# Patient Record
Sex: FEMALE | Race: BLACK OR AFRICAN AMERICAN | NOT HISPANIC OR LATINO | URBAN - METROPOLITAN AREA
[De-identification: names, ages, dates, MRNs, and addresses within clinical notes are randomized per-mention and may not be internally consistent; named-entity substitution may affect disease eponyms.]

---

## 2024-11-04 ENCOUNTER — TELEPHONE (OUTPATIENT)
Dept: HEMATOLOGY/ONCOLOGY | Facility: CLINIC | Age: 34
End: 2024-11-04
Payer: COMMERCIAL

## 2024-11-05 ENCOUNTER — TELEPHONE (OUTPATIENT)
Dept: HEMATOLOGY/ONCOLOGY | Facility: CLINIC | Age: 34
End: 2024-11-05
Payer: COMMERCIAL

## 2024-11-05 NOTE — TELEPHONE ENCOUNTER
----- Message from Joy sent at 11/5/2024  9:45 AM CST -----  Regarding: return call  Contact: patient  Type:  Patient Returning Call    Who Called:  patient  Who Left Message for Patient:    Hollie  Does the patient know what this is regarding?:  scheduling  Best Call Back Number:  [phone]  Additional Information:  Please call patient to advise.  Thanks!

## 2024-11-06 ENCOUNTER — PATIENT MESSAGE (OUTPATIENT)
Dept: HEMATOLOGY/ONCOLOGY | Facility: CLINIC | Age: 34
End: 2024-11-06

## 2024-11-06 ENCOUNTER — OFFICE VISIT (OUTPATIENT)
Dept: HEMATOLOGY/ONCOLOGY | Facility: CLINIC | Age: 34
End: 2024-11-06
Payer: COMMERCIAL

## 2024-11-06 ENCOUNTER — OFFICE VISIT (OUTPATIENT)
Dept: INTERNAL MEDICINE | Facility: CLINIC | Age: 34
End: 2024-11-06
Payer: COMMERCIAL

## 2024-11-06 VITALS
BODY MASS INDEX: 31.2 KG/M2 | OXYGEN SATURATION: 100 % | DIASTOLIC BLOOD PRESSURE: 102 MMHG | RESPIRATION RATE: 20 BRPM | WEIGHT: 222.88 LBS | HEART RATE: 94 BPM | SYSTOLIC BLOOD PRESSURE: 168 MMHG | HEIGHT: 71 IN | TEMPERATURE: 98 F

## 2024-11-06 DIAGNOSIS — D50.9 MICROCYTIC ANEMIA: Primary | ICD-10-CM

## 2024-11-06 DIAGNOSIS — I10 HTN (HYPERTENSION), BENIGN: Primary | ICD-10-CM

## 2024-11-06 PROCEDURE — 3077F SYST BP >= 140 MM HG: CPT | Mod: CPTII,S$GLB,, | Performed by: INTERNAL MEDICINE

## 2024-11-06 PROCEDURE — 3008F BODY MASS INDEX DOCD: CPT | Mod: CPTII,S$GLB,, | Performed by: INTERNAL MEDICINE

## 2024-11-06 PROCEDURE — 3080F DIAST BP >= 90 MM HG: CPT | Mod: CPTII,S$GLB,, | Performed by: INTERNAL MEDICINE

## 2024-11-06 PROCEDURE — 1160F RVW MEDS BY RX/DR IN RCRD: CPT | Mod: CPTII,95,, | Performed by: INTERNAL MEDICINE

## 2024-11-06 PROCEDURE — 1159F MED LIST DOCD IN RCRD: CPT | Mod: CPTII,95,, | Performed by: INTERNAL MEDICINE

## 2024-11-06 PROCEDURE — 99214 OFFICE O/P EST MOD 30 MIN: CPT | Mod: S$GLB,,, | Performed by: INTERNAL MEDICINE

## 2024-11-06 PROCEDURE — G2211 COMPLEX E/M VISIT ADD ON: HCPCS | Mod: 95,,, | Performed by: INTERNAL MEDICINE

## 2024-11-06 PROCEDURE — 99999 PR PBB SHADOW E&M-EST. PATIENT-LVL III: CPT | Mod: PBBFAC,,, | Performed by: INTERNAL MEDICINE

## 2024-11-06 PROCEDURE — 1159F MED LIST DOCD IN RCRD: CPT | Mod: CPTII,S$GLB,, | Performed by: INTERNAL MEDICINE

## 2024-11-06 PROCEDURE — 99204 OFFICE O/P NEW MOD 45 MIN: CPT | Mod: 95,,, | Performed by: INTERNAL MEDICINE

## 2024-11-06 RX ORDER — LOSARTAN POTASSIUM 25 MG/1
TABLET ORAL
COMMUNITY
Start: 2024-10-01

## 2024-11-06 RX ORDER — AMLODIPINE BESYLATE 10 MG/1
TABLET ORAL
COMMUNITY
Start: 2024-10-01

## 2024-11-06 RX ORDER — HYDROCHLOROTHIAZIDE 25 MG/1
TABLET ORAL
COMMUNITY
Start: 2024-10-01

## 2024-11-06 NOTE — PROGRESS NOTES
History of present illness:   [age] year lady 1st visit at our institution here to follow-up from an ED department visit on November 1, 2024.  Patient referred to ED after her ophthalmologist stated that the blood vessels in her eyes appeared swollen.  The patient was asymptomatic.  Found to have hypertensive urgency in the ED. lab data normal, chest x-ray normal, ECG unremarkable.  The patient has a known history of hypertension and has been taking her medications as directed.  She has not been having any chest pain palpitations syncope presyncope.  No severe headaches and no neurological symptoms.  She receives her medical care in [place] with PCP and also has seen a cardiologist there as well regarding her hypertension.  She also gives history of having been told she has a fluctuating anemia which was noted also on the lab data from the ED.    Current medications:   Amlodipine 10 mg daily.    Losartan 25 mg daily.    HCTZ 25 mg daily.    Past medical history:   Hypertension.    Anemia.    Social history, nonsmoker.  No alcohol excess.      Review of systems:   Constitutional: No fever no chills no generalized body aches.  HEENT: No hoarseness no dysphagia.    Respiratory: No cough shortness of breath.    Cardiovascular: No chest pain or palpitations and no syncope.  No claudication.  No edema.    GI: No nausea no vomiting abdominal pain or diarrhea.    : No dysuria frequency change in color character urine.    Neurologic:  No headaches or focal neurological symptoms.    Physical examination:   General: Pleasant alert appropriately groomed lady no acute distress.    Vital signs: Blood pressure taken manually by this examiner 148/102 similar in both arms.  HEENT: Normocephalic.  Neck supple no masses.  Lungs: Clear to auscultation.    Cardiovascular: Regular rate rhythm.  No significant murmur.  Carotids full bilaterally bruits.  No peripheral extremity edema.  Mental status: Alert oriented affect mood all  appropriate.    Data:   Reviewed the lab data from the ED department from November 1st.  Chemistry profile TSH normal CBC shows a significant microcytic anemia.      Impression:   Hypertension control not optimal.    Microcytic anemia which sounds chronic.  Suspect related to menses      Plan:   Increase losartan to 50 mg daily.    She has cardiology appointment in five days at our institution and will follow-up in that regard.      She has not a virtual visit with one of our hematology/oncology providers later this morning and I will defer the anemia evaluation in that regard.  Chest x-ray and ECG unremarkable.   Abdominal Pain, N/V/D Allergic Rx

## 2024-11-06 NOTE — PROGRESS NOTES
CONSULT TELEMEDICINE VISIT    Subjective:      Patient ID: Soraya Johnson is a 34 y.o. female.  MRN: [id number]  : 1990    An audio and visual care visit was performed with the patient because of the COVID-19 pandemic recommendations for social distancing.    TELEMEDICINE  The patient location is: home  The chief complaint leading to consultation is: anemia  Visit type: Virtual visit with synchronous audio and video      46 minutes of total time spent on the encounter, which includes face to face time and non-face to face time preparing to see the patient (eg, review of tests), obtaining and/or reviewing separately obtained history, documenting clinical information in the electronic or other health record, independently interpreting results (not separately reported) and communicating results to the patient/family/caregiver, or care coordination (not separately reported).    Each patient to whom he or she provides medical services by telemedicine is:  (1) informed of the relationship between the physician and patient and the respective role of any other health care provider with respect to management of the patient; and (2) notified that he or she may decline to receive medical services by telemedicine and may withdraw from such care at any time.    History of Present Illness:   \[facility] [place] [facility]\"" Miss. Alex Toribio is a 35 yo female presenting for evaluation of microcytic anemia.  She is reporting to this visit by herself.      In  she had her COVID vaccine and she started having anemia.  Her counts were fluctuating but she did not require taking oral iron or IV iron.  She was admitted to the hospital after developing significant clotting upon having her menstrual cycle.  At that time she received a unit of PRBC.  She has been able to establish a hematologist since she has been traveling back and forth to [place].    Blood workup done on on 2024 showed a hemoglobin of 7.4 with an MCV of 69.  CMP was  normal.    She is feeling tired.  Her menstrual cycle is regular lasting 5-7 days with a mild to moderate flow.  She is not a vegetarian.  She denies any family history of colorectal cancer.    The patient denies CP, cough, SOB, abdominal pain, nausea, vomiting, constipation.  The patient denies fever, chills, night sweats, weight loss, new lumps or bumps, easy bruising or bleeding.      Oncology History:  Oncology History    No history exists.      Past medical, surgical, family, and social history were reviewed today and there are no changes of note unless mentioned in HPI.   MEDS and ALLERGIES were reviewed with patient and meds reconciled.     History:  Past Medical History:   Diagnosis Date    Anemia, unspecified     HTN (hypertension)       History reviewed. No pertinent surgical history.  No family history on file.   Social History     Tobacco Use    Smoking status: Never    Smokeless tobacco: Never   Substance and Sexual Activity    Alcohol use: Not on file    Drug use: Not on file    Sexual activity: Not on file        ROS:  Answers submitted by the patient for this visit:  Review of Systems Questionnaire (Submitted on 11/5/2024)  appetite change : No  unexpected weight change: No  mouth sores: No  visual disturbance: No  cough: No  shortness of breath: No  chest pain: No  abdominal pain: No  diarrhea: No  frequency: No  back pain: No  rash: No  headaches: No  adenopathy: No  nervous/ anxious: No      Objective:   There were no vitals filed for this visit.  Wt Readings from Last 10 Encounters:   11/06/24 101.1 kg (222 lb 14.2 oz)   11/06/24 115.7 kg (255 lb)   11/01/24 116 kg (255 lb 11.7 oz)       Physical Examination:   Constitutional: she is alert, pleasant, and does not appear to be in any physical distress   HENT: Mouth/Throat:  Tongue is midline without evidence of glossitis  Eyes: No obvious jaundice or conjunctivitis.  EOM are normal.   Pulmonary/Chest: No stridor noted. No excess chest muscle  movement.  Neurological: she is alert and oriented to person, place, and time. No cranial nerve deficit.  Skin:  No rash noted. No erythema.   Psychiatric: she has a normal mood and affect. Speech and memory normal.     Diagnostic Tests:  Significant Imaging:  I have reviewed and interpreted all pertinent imaging results/findings.    Laboratory Data:  Lab Results   Component Value Date    WBC 6.70 11/01/2024    HGB 7.4 (L) 11/01/2024    HCT 29.5 (L) 11/01/2024     11/01/2024    ALT 15 11/01/2024    AST 13 11/01/2024     11/01/2024    K 3.3 (L) 11/01/2024     11/01/2024    CREATININE 0.8 11/01/2024    BUN 10 11/01/2024    CO2 23 11/01/2024    TSH 0.973 11/01/2024        Labs:   Lab Results   Component Value Date    WBC 6.70 11/01/2024    RBC 4.30 11/01/2024    HGB 7.4 (L) 11/01/2024    HCT 29.5 (L) 11/01/2024    MCV 69 (L) 11/01/2024     11/01/2024    GLU 97 11/01/2024     11/01/2024    K 3.3 (L) 11/01/2024    BUN 10 11/01/2024    CREATININE 0.8 11/01/2024    AST 13 11/01/2024    ALT 15 11/01/2024    BILITOT 0.3 11/01/2024         Assessment/Plan:     ECOG SCORE    0 - Fully active-able to carry on all pre-disease performance without restriction       Microcytic anemia:  I reviewed independently the patient medical record including her laboratory radiologic findings.    The differential diagnosis of a microcytic anemia was discussed at length.  Most likely the patient has iron-deficiency anemia due to her menorrhagia.  She will be started on oral iron 1 pill a day on an empty stomach.  In case she is not able to tolerated on an empty stomach she will start taking it with food and vitamin-C.  The side effects of oral iron was reviewed at length.  This include abdominal pain cramping nausea black stools and constipation.  She was advised to take stool softener on a regular basis.    She will be seen back again in 6 weeks for a follow up visit with repeat CBC and ferritin.      Med Onc  Chart Routing      Follow up with physician 6 weeks. virtual with repeat CBC and ferritin to be done in [place]   Follow up with [facility]    Infusion scheduling note    Injection scheduling note    Labs    Imaging    Pharmacy appointment    Other referrals                   Plan was discussed with the patient at length, and she verbalized understanding. Soraya was given an opportunity to ask questions that were answered to her satisfaction, and she was advised to call in the interval if any problems or questions arise.  Discussed COVID-19 and social distancing in great detail, avoid all non-essential visits out of the home if possible and avoid sick contacts.     Electronically signed by Marybeth Posey MD

## 2024-11-06 NOTE — PROGRESS NOTES
Subjective:       Name: Soraya Johnson  : 1990  MRN: [id number]    No chief complaint on file.       Patient is in clinic    HPI: Soraya Johnson is a 34 y.o. female presents for evaluation of No chief complaint on file.    Blood workup done on on 2024 showed a hemoglobin of 7.4 with an MCV of 69.  CMP was normal.    The patient denies CP, cough, SOB, abdominal pain, nausea, vomiting, constipation.  The patient denies fever, chills, night sweats, weight loss, new lumps or bumps, easy bruising or bleeding.      Oncology History    No history exists.        No past medical history on file.    No past surgical history on file.    No family history on file.    Social History     Socioeconomic History    Marital status: Single   Tobacco Use    Smoking status: Never    Smokeless tobacco: Never     Social Drivers of Health     Financial Resource Strain: Low Risk  (11/3/2024)    Overall Financial Resource Strain (CARDIA)     Difficulty of Paying Living Expenses: Not very hard   Food Insecurity: No Food Insecurity (11/3/2024)    Hunger Vital Sign     Worried About Running Out of Food in the Last Year: Never true     Ran Out of Food in the Last Year: Never true   Physical Activity: Insufficiently Active (11/3/2024)    Exercise Vital Sign     Days of Exercise per Week: 3 days     Minutes of Exercise per Session: 40 min   Stress: No Stress Concern Present (11/3/2024)    [employer] - Occupational Stress Questionnaire     Feeling of Stress : Not at all   Housing Stability: Unknown (11/3/2024)    Housing Stability Vital Sign     Unable to Pay for Housing in the Last Year: No       Review of patient's allergies indicates:  No Known Allergies      ROS:  Answers submitted by the patient for this visit:  Review of Systems Questionnaire (Submitted on 2024)  appetite change : No  unexpected weight change: No  mouth sores: No  visual disturbance: No  cough: No  shortness of  breath: No  chest pain: No  abdominal pain: No  diarrhea: No  frequency: No  back pain: No  rash: No  headaches: No  adenopathy: No  nervous/ anxious: No           Objective:   There were no vitals filed for this visit.     Physical Exam           No current outpatient medications on file prior to visit.     No current facility-administered medications on file prior to visit.       CBC:  Lab Results   Component Value Date    WBC 6.70 11/01/2024    HGB 7.4 (L) 11/01/2024    HCT 29.5 (L) 11/01/2024    MCV 69 (L) 11/01/2024     11/01/2024         CMP:  Sodium   Date Value Ref Range Status   11/01/2024 140 136 - 145 mmol/L Final     Potassium   Date Value Ref Range Status   11/01/2024 3.3 (L) 3.5 - 5.1 mmol/L Final     Chloride   Date Value Ref Range Status   11/01/2024 106 95 - 110 mmol/L Final     CO2   Date Value Ref Range Status   11/01/2024 23 23 - 29 mmol/L Final     Glucose   Date Value Ref Range Status   11/01/2024 97 70 - 110 mg/dL Final     BUN   Date Value Ref Range Status   11/01/2024 10 6 - 20 mg/dL Final     Creatinine   Date Value Ref Range Status   11/01/2024 0.8 0.5 - 1.4 mg/dL Final     Calcium   Date Value Ref Range Status   11/01/2024 9.2 8.7 - 10.5 mg/dL Final     Total Protein   Date Value Ref Range Status   11/01/2024 7.6 6.0 - 8.4 g/dL Final     Albumin   Date Value Ref Range Status   11/01/2024 4.0 3.5 - 5.2 g/dL Final     Total Bilirubin   Date Value Ref Range Status   11/01/2024 0.3 0.1 - 1.0 mg/dL Final     Comment:     For infants and newborns, interpretation of results should be based  on gestational age, weight and in agreement with clinical  observations.    Premature Infant recommended reference ranges:  Up to 24 hours.............<8.0 mg/dL  Up to 48 hours............<12.0 mg/dL  3-5 days..................<15.0 mg/dL  6-29 days.................<15.0 mg/dL       Alkaline Phosphatase   Date Value Ref Range Status   11/01/2024 98 40 - 150 U/L Final     AST   Date Value Ref Range  Status   11/01/2024 13 10 - 40 U/L Final     ALT   Date Value Ref Range Status   11/01/2024 15 10 - 44 U/L Final     Anion Gap   Date Value Ref Range Status   11/01/2024 11 8 - 16 mmol/L Final       CT Head Without Contrast  Narrative: EXAMINATION:  CT HEAD WITHOUT CONTRAST    CLINICAL HISTORY:  Headache, new or worsening, neuro deficit (Age 19-49y);Elevated BP;    TECHNIQUE:  Low dose axial CT images obtained throughout the head without intravenous contrast. Sagittal and coronal reconstructions were performed.    COMPARISON:  None available.    FINDINGS:  Ventricles and sulci are normal in size for age without evidence of hydrocephalus. No extra-axial blood or fluid collections.  The brain parenchyma is normal. No parenchymal mass, hemorrhage, edema or major vascular distribution infarct.  Empty sella configuration noted.    No calvarial fracture.  The scalp is unremarkable.  Bilateral paranasal sinuses and mastoid air cells are clear.  Impression: No acute intracranial abnormality.    Electronically signed by: Edu Mixon  Date:    11/01/2024  Time:    18:24  X-Ray Chest AP Portable  Narrative: EXAMINATION:  XR CHEST AP PORTABLE    CLINICAL HISTORY:  Chest Pain;    TECHNIQUE:  Single frontal view of the chest was performed.    COMPARISON:  None    FINDINGS:  The lungs are well expanded and clear. No focal opacities are seen. The pleural spaces are clear. The cardiac silhouette is unremarkable. The visualized osseous structures are unremarkable.  Impression: No acute abnormality.    Electronically signed by: Edu Mixon  Date:    11/01/2024  Time:    17:31       ECOG SCORE                  Assessment/Plan:       There are no diagnoses linked to this encounter.          [facility] [place] Route Chart for Scheduling     Plan was discussed with the patient at length, and she verbalized understanding. Soraya was given an opportunity to ask questions that were answered to her satisfaction, and she was advised to call in the  interval if any problems or questions arise.  Signed:  Marybeth Posey MD   Hematology and Oncology  [facility] - HEMATOLOGY ONCOLOGY  [facility], [place]

## 2024-11-11 ENCOUNTER — OFFICE VISIT (OUTPATIENT)
Dept: CARDIOLOGY | Facility: CLINIC | Age: 34
End: 2024-11-11
Payer: COMMERCIAL

## 2024-11-11 VITALS
DIASTOLIC BLOOD PRESSURE: 90 MMHG | BODY MASS INDEX: 34.71 KG/M2 | OXYGEN SATURATION: 99 % | HEART RATE: 120 BPM | SYSTOLIC BLOOD PRESSURE: 150 MMHG | WEIGHT: 247.94 LBS | HEIGHT: 71 IN

## 2024-11-11 DIAGNOSIS — R06.09 DOE (DYSPNEA ON EXERTION): ICD-10-CM

## 2024-11-11 DIAGNOSIS — I10 PRIMARY HYPERTENSION: Primary | ICD-10-CM

## 2024-11-11 PROCEDURE — 3080F DIAST BP >= 90 MM HG: CPT | Mod: CPTII,S$GLB,, | Performed by: INTERNAL MEDICINE

## 2024-11-11 PROCEDURE — 1159F MED LIST DOCD IN RCRD: CPT | Mod: CPTII,S$GLB,, | Performed by: INTERNAL MEDICINE

## 2024-11-11 PROCEDURE — 3077F SYST BP >= 140 MM HG: CPT | Mod: CPTII,S$GLB,, | Performed by: INTERNAL MEDICINE

## 2024-11-11 PROCEDURE — 99204 OFFICE O/P NEW MOD 45 MIN: CPT | Mod: S$GLB,,, | Performed by: INTERNAL MEDICINE

## 2024-11-11 PROCEDURE — 4010F ACE/ARB THERAPY RXD/TAKEN: CPT | Mod: CPTII,S$GLB,, | Performed by: INTERNAL MEDICINE

## 2024-11-11 PROCEDURE — 3008F BODY MASS INDEX DOCD: CPT | Mod: CPTII,S$GLB,, | Performed by: INTERNAL MEDICINE

## 2024-11-11 PROCEDURE — 99999 PR PBB SHADOW E&M-EST. PATIENT-LVL III: CPT | Mod: PBBFAC,,, | Performed by: INTERNAL MEDICINE

## 2024-11-11 RX ORDER — VALSARTAN AND HYDROCHLOROTHIAZIDE 320; 25 MG/1; MG/1
1 TABLET, FILM COATED ORAL DAILY
Qty: 90 TABLET | Refills: 3 | Status: SHIPPED | OUTPATIENT
Start: 2024-11-11 | End: 2025-11-11

## 2024-12-11 ENCOUNTER — HOSPITAL ENCOUNTER (OUTPATIENT)
Dept: CARDIOLOGY | Facility: HOSPITAL | Age: 34
Discharge: HOME | End: 2024-12-11
Attending: INTERNAL MEDICINE
Payer: COMMERCIAL

## 2024-12-11 DIAGNOSIS — I10 PRIMARY HYPERTENSION: ICD-10-CM

## 2024-12-11 DIAGNOSIS — R06.09 DOE (DYSPNEA ON EXERTION): ICD-10-CM

## 2024-12-11 LAB
APICAL FOUR CHAMBER EJECTION FRACTION: 71 %
APICAL TWO CHAMBER EJECTION FRACTION: 67 %
ASCENDING AORTA: 3.54 CM
AV INDEX (PROSTH): 0.8
AV MEAN GRADIENT: 3.4 MMHG
AV PEAK GRADIENT: 5.8 MMHG
AV VALVE AREA BY VELOCITY RATIO: 2.8 CM²
AV VALVE AREA: 3 CM²
AV VELOCITY RATIO: 0.75
CV ECHO LV RWT: 0.6 CM
DOP CALC AO PEAK VEL: 1.2 M/S
DOP CALC AO VTI: 28.4 CM
DOP CALC LVOT AREA: 3.8 CM2
DOP CALC LVOT DIAMETER: 2.2 CM
DOP CALC LVOT PEAK VEL: 0.9 M/S
DOP CALC LVOT STROKE VOLUME: 86.2 CM3
DOP CALCLVOT PEAK VEL VTI: 22.7 CM
E WAVE DECELERATION TIME: 211.26 MSEC
E/A RATIO: 1.61
E/E' RATIO: 9 M/S
ECHO LV POSTERIOR WALL: 1.4 CM (ref 0.6–1.1)
FRACTIONAL SHORTENING: 36.2 % (ref 28–44)
INTERVENTRICULAR SEPTUM: 1.3 CM (ref 0.6–1.1)
IVC DIAMETER: 1.66 CM
LA MAJOR: 5.27 CM
LA MINOR: 4.69 CM
LA WIDTH: 4.3 CM
LEFT ATRIUM SIZE: 4.49 CM
LEFT ATRIUM VOLUME: 81.45 CM3
LEFT INTERNAL DIMENSION IN SYSTOLE: 3 CM (ref 2.1–4)
LEFT VENTRICLE DIASTOLIC VOLUME: 103.98 ML
LEFT VENTRICLE END DIASTOLIC VOLUME APICAL 2 CHAMBER: 113.01 ML
LEFT VENTRICLE END DIASTOLIC VOLUME APICAL 4 CHAMBER: 119.29 ML
LEFT VENTRICLE SYSTOLIC VOLUME: 33.9 ML
LEFT VENTRICULAR INTERNAL DIMENSION IN DIASTOLE: 4.7 CM (ref 3.5–6)
LEFT VENTRICULAR MASS: 251.4 G
LV LATERAL E/E' RATIO: 8.18 M/S
LV SEPTAL E/E' RATIO: 10 M/S
LVED V (TEICH): 103.98 ML
LVES V (TEICH): 33.9 ML
LVOT MG: 1.94 MMHG
LVOT MV: 0.66 CM/S
MV PEAK A VEL: 0.56 M/S
MV PEAK E VEL: 0.9 M/S
MV STENOSIS PRESSURE HALF TIME: 61.27 MS
MV VALVE AREA P 1/2 METHOD: 3.59 CM2
OHS CV RV/LV RATIO: 0.68 CM
OHS LV EJECTION FRACTION SIMPSONS BIPLANE MOD: 69 %
PISA TR MAX VEL: 2.44 M/S
PULM VEIN S/D RATIO: 1.29
PV PEAK D VEL: 0.34 M/S
PV PEAK GRADIENT: 3 MMHG
PV PEAK S VEL: 0.44 M/S
PV PEAK VELOCITY: 0.87 M/S
RA MAJOR: 3.9 CM
RA PRESSURE ESTIMATED: 3 MMHG
RA WIDTH: 3 CM
RIGHT VENTRICLE DIASTOLIC BASEL DIMENSION: 3.2 CM
RIGHT VENTRICULAR END-DIASTOLIC DIMENSION: 3.19 CM
RV TB RVSP: 5 MMHG
RV TISSUE DOPPLER FREE WALL SYSTOLIC VELOCITY 1 (APICAL 4 CHAMBER VIEW): 13.51 CM/S
SINUS: 3.21 CM
STJ: 3.59 CM
TDI LATERAL: 0.11 M/S
TDI SEPTAL: 0.09 M/S
TDI: 0.1 M/S
TR MAX PG: 24 MMHG
TRICUSPID ANNULAR PLANE SYSTOLIC EXCURSION: 2.13 CM
TV REST PULMONARY ARTERY PRESSURE: 27 MMHG

## 2024-12-11 PROCEDURE — 93306 TTE W/DOPPLER COMPLETE: CPT

## 2024-12-11 PROCEDURE — 93306 TTE W/DOPPLER COMPLETE: CPT | Mod: 26,,, | Performed by: INTERNAL MEDICINE

## 2024-12-13 ENCOUNTER — OFFICE VISIT (OUTPATIENT)
Dept: CARDIOLOGY | Facility: CLINIC | Age: 34
End: 2024-12-13
Payer: COMMERCIAL

## 2024-12-13 VITALS
OXYGEN SATURATION: 100 % | BODY MASS INDEX: 34.72 KG/M2 | HEIGHT: 71 IN | SYSTOLIC BLOOD PRESSURE: 144 MMHG | WEIGHT: 248 LBS | HEART RATE: 79 BPM | DIASTOLIC BLOOD PRESSURE: 98 MMHG

## 2024-12-13 DIAGNOSIS — R06.09 DOE (DYSPNEA ON EXERTION): ICD-10-CM

## 2024-12-13 DIAGNOSIS — I10 PRIMARY HYPERTENSION: Primary | ICD-10-CM

## 2024-12-13 PROCEDURE — 99999 PR PBB SHADOW E&M-EST. PATIENT-LVL III: CPT | Mod: PBBFAC,,, | Performed by: INTERNAL MEDICINE

## 2024-12-13 RX ORDER — METOPROLOL SUCCINATE 50 MG/1
50 TABLET, EXTENDED RELEASE ORAL DAILY
Qty: 90 TABLET | Refills: 3 | Status: SHIPPED | OUTPATIENT
Start: 2024-12-13 | End: 2025-12-13

## 2024-12-13 NOTE — PROGRESS NOTES
Subjective   Patient ID:  Soraya Johnson is a 34 y.o. female who presents for follow-up of Results      HPI      HTN     Went to the ER 11/1/24  This is a 34 year old female who presents to the ED with complaint of elevated blood pressure. She reports that she was at the Ophthalmologist receiving an eye exam and they told her her optic vessels were swollen. She denies dizziness, blurred vision, shortness of breath, chest pain and headache. She is currently on BP meds and compliant.     EKG 11/1/24 NSR NSSTT changes    Echo 12/11/24    Left Ventricle: The left ventricle is normal in size. Normal wall thickness. There is normal systolic function with a visually estimated ejection fraction of 60 - 65%. Quantitated ejection fraction is 69%. There is normal diastolic function.    Right Ventricle: Normal right ventricular cavity size. Systolic function is normal.    Left Atrium: Left atrium is moderately dilated.    Mitral Valve: There is mild regurgitation.    Pulmonary Artery: The estimated pulmonary artery systolic pressure is 27 mmHg.    IVC/SVC: Normal venous pressure at 3 mmHg.        11/11/24 has been on BP medication for 1 year and BP still not well controlled  Denies CP or SOB  Works at [employer]  Not a smoker  Family Hx negative for premature CAD  Stop HCTZ and losartan - start diovan//25 qd  OV 1 month with BMP and echo     Labs 12/11/24  K 3.8  Cr 0.9    12/13/24 Denies CP or SOB. BP improved but not at goal    Review of Systems   Constitutional: Negative for decreased appetite.   HENT:  Negative for ear discharge.    Eyes:  Negative for blurred vision.   Respiratory:  Negative for hemoptysis.    Endocrine: Negative for polyphagia.   Hematologic/Lymphatic: Negative for adenopathy.   Skin:  Negative for color change.   Musculoskeletal:  Negative for joint swelling.   Genitourinary:  Negative for bladder incontinence.   Neurological:  Negative for brief paralysis.   Psychiatric/Behavioral:  Negative for  hallucinations.    Allergic/Immunologic: Negative for hives.          Objective     Physical Exam  Constitutional:       Appearance: She is well-developed.   HENT:      Head: Normocephalic and atraumatic.   Eyes:      Conjunctiva/sclera: Conjunctivae normal.      Pupils: Pupils are equal, round, and reactive to light.   Cardiovascular:      Rate and Rhythm: Normal rate.      Pulses: Intact distal pulses.      Heart sounds: Normal heart sounds.   Pulmonary:      Effort: Pulmonary effort is normal.      Breath sounds: Normal breath sounds.   Abdominal:      General: Bowel sounds are normal.      Palpations: Abdomen is soft.   Musculoskeletal:         General: Normal range of motion.      Cervical back: Normal range of motion and neck supple.   Skin:     General: Skin is warm and dry.   Neurological:      Mental Status: She is alert and oriented to person, place, and time.            Assessment and Plan     1. Primary hypertension    2. SHINE (dyspnea on exertion)        Plan:     Add toprol XL 50 qd for HTN  OV 1 month with BP check  Continue Rx for HTN    Advance Care Planning     Date: 12/13/2024  Patient did not wish or was not able to name a surrogate decision maker or provide an Advance Care Plan.

## 2024-12-17 ENCOUNTER — OFFICE VISIT (OUTPATIENT)
Dept: HEMATOLOGY/ONCOLOGY | Facility: CLINIC | Age: 34
End: 2024-12-17
Payer: COMMERCIAL

## 2024-12-17 DIAGNOSIS — D50.8 OTHER IRON DEFICIENCY ANEMIA: ICD-10-CM

## 2024-12-17 DIAGNOSIS — D50.9 MICROCYTIC ANEMIA: Primary | ICD-10-CM

## 2024-12-17 PROCEDURE — G2211 COMPLEX E/M VISIT ADD ON: HCPCS | Mod: 95,,, | Performed by: INTERNAL MEDICINE

## 2024-12-17 PROCEDURE — 99213 OFFICE O/P EST LOW 20 MIN: CPT | Mod: 95,,, | Performed by: INTERNAL MEDICINE

## 2024-12-17 NOTE — PROGRESS NOTES
FOLLOW-UP TELEMEDICINE VISIT    Subjective:      Patient ID: Soraya Johnson is a 34 y.o. female.  MRN: [id number]  : 1990    An audio and visual care visit was performed with the patient because of the COVID-19 pandemic recommendations for social distancing.    TELEMEDICINE  The patient location is: home  The chief complaint leading to consultation is: Iron deficiency anemia  Visit type: Virtual visit with synchronous audio and video      25 minutes of total time spent on the encounter, which includes face to face time and non-face to face time preparing to see the patient (eg, review of tests), obtaining and/or reviewing separately obtained history, documenting clinical information in the electronic or other health record, independently interpreting results (not separately reported) and communicating results to the patient/family/caregiver, or care coordination (not separately reported).    Each patient to whom he or she provides medical services by telemedicine is:  (1) informed of the relationship between the physician and patient and the respective role of any other health care provider with respect to management of the patient; and (2) notified that he or she may decline to receive medical services by telemedicine and may withdraw from such care at any time.    History of Present Illness:   HPI Miss. Alex Toribio is a 33 yo female presenting for evaluation of her iron deficiency anemia.    She is reporting to this visit by herself.    She has been taking oral iron 1 pill a day.  She is still feeling mildly tired.      The patient denies CP, cough, SOB, abdominal pain, nausea, vomiting, constipation.  The patient denies fever, chills, night sweats, weight loss, new lumps or bumps, easy bruising or bleeding.      Oncology History:  Oncology History    No history exists.      Past medical, surgical, family, and social history were reviewed today and there are no changes of note unless mentioned in HPI.    MEDS and ALLERGIES were reviewed with patient and meds reconciled.     History:  Past Medical History:   Diagnosis Date    Anemia, unspecified     HTN (hypertension)       No past surgical history on file.  No family history on file.   Social History     Tobacco Use    Smoking status: Never    Smokeless tobacco: Never   Substance and Sexual Activity    Alcohol use: Not on file    Drug use: Not on file    Sexual activity: Not on file        ROS:  Answers submitted by the patient for this visit:  Review of Systems Questionnaire (Submitted on 12/10/2024)  appetite change : No  unexpected weight change: No  mouth sores: No  visual disturbance: No  cough: No  shortness of breath: No  chest pain: No  abdominal pain: No  diarrhea: No  frequency: No  back pain: No  rash: No  headaches: No  adenopathy: No  nervous/ anxious: No        Objective:   There were no vitals filed for this visit.  Wt Readings from Last 10 Encounters:   12/13/24 112.5 kg (248 lb 0.3 oz)   11/11/24 112.5 kg (247 lb 14.5 oz)   11/06/24 101.1 kg (222 lb 14.2 oz)   11/06/24 115.7 kg (255 lb)   11/01/24 116 kg (255 lb 11.7 oz)       Physical Examination:   Constitutional: she is alert, pleasant, and does not appear to be in any physical distress   HENT: Mouth/Throat:  Tongue is midline without evidence of glossitis  Eyes: No obvious jaundice or conjunctivitis.  EOM are normal.   Pulmonary/Chest: No stridor noted. No excess chest muscle movement.  Neurological: she is alert and oriented to person, place, and time. No cranial nerve deficit.  Skin:  No rash noted. No erythema.   Psychiatric: she has a normal mood and affect. Speech and memory normal.     Diagnostic Tests:  Significant Imaging:  I have reviewed and interpreted all pertinent imaging results/findings.    Laboratory Data:  Lab Results   Component Value Date    WBC 5.13 12/11/2024    HGB 8.6 (L) 12/11/2024    HCT 32.1 (L) 12/11/2024     12/11/2024    ALT 15 11/01/2024    AST 13  11/01/2024     12/11/2024    K 3.8 12/11/2024     12/11/2024    CREATININE 0.9 12/11/2024    BUN 12 12/11/2024    CO2 25 12/11/2024    TSH 0.973 11/01/2024        Labs:   Lab Results   Component Value Date    WBC 5.13 12/11/2024    RBC 4.22 12/11/2024    HGB 8.6 (L) 12/11/2024    HCT 32.1 (L) 12/11/2024    MCV 76 (L) 12/11/2024     12/11/2024    GLU 94 12/11/2024     12/11/2024    K 3.8 12/11/2024    BUN 12 12/11/2024    CREATININE 0.9 12/11/2024    AST 13 11/01/2024    ALT 15 11/01/2024    BILITOT 0.3 11/01/2024         Assessment/Plan:     ECOG SCORE    1 - Restricted in strenuous activity-ambulatory and able to carry out work of a light nature       Microcytic anemia:  I reviewed independently the patient medical record including her laboratory radiologic findings.    The differential diagnosis of a microcytic anemia was discussed at length.  Most likely the patient has iron-deficiency anemia due to her menorrhagia.  She was advised to continue taking on oral iron 2 pills a day on an empty stomach.  In case she is not able to tolerated on an empty stomach she will start taking it with food and vitamin-C.  The side effects of oral iron was reviewed at length.  This include abdominal pain cramping nausea black stools and constipation.  She was advised to take stool softener on a regular basis.    She will be seen back again in 2 months for a follow up visit with repeat CBC and ferritin.    Med Onc Route Chart for Scheduling     Plan was discussed with the patient at length, and she verbalized understanding. Soraya was given an opportunity to ask questions that were answered to her satisfaction, and she was advised to call in the interval if any problems or questions arise.  Discussed COVID-19 and social distancing in great detail, avoid all non-essential visits out of the home if possible and avoid sick contacts.     Electronically signed by Marybeth Posey MD